# Patient Record
(demographics unavailable — no encounter records)

---

## 2024-10-30 NOTE — REVIEW OF SYSTEMS
[Fever] : no fever [Fatigue] : no fatigue [Recent Wt Gain (___ Lbs)] : ~T no recent weight gain [Chills] : no chills [Poor Appetite] : no poor appetite [Recent Wt Loss (___ Lbs)] : ~T no recent weight loss [Sore Throat] : no sore throat [Nasal Congestion] : no nasal congestion [Postnasal Drip] : postnasal drip [Sinus Problems] : no sinus problems [Cough] : cough [Chest Tightness] : no chest tightness [Sputum] : no sputum [Dyspnea] : no dyspnea [Pleuritic Pain] : no pleuritic pain [Wheezing] : no wheezing [SOB on Exertion] : no sob on exertion [Chest Discomfort] : no chest discomfort [Edema] : no edema [Palpitations] : palpitations [Seasonal Allergies] : no seasonal allergies [GERD] : no gerd [Abdominal Pain] : no abdominal pain [Nausea] : no nausea [Vomiting] : no vomiting [Dysuria] : no dysuria [Arthralgias] : no arthralgias [Myalgias] : no myalgias [Rash] : no rash [Easy Bruising] : no easy bruising [Clotting Disorder/ Frequent bleeding] : no clotting disorder/ frequent bleeding [Headache] : no headache [Focal Weakness] : no focal weakness [Dizziness] : no dizziness [Numbness] : no numbness [Anxiety] : anxiety [Diabetes] : no diabetes [Thyroid Problem] : thyroid problem [Obesity] : no obesity

## 2024-10-30 NOTE — HISTORY OF PRESENT ILLNESS
[TextBox_4] : Current HPI:  Reports breathing typically under control - main concern is persistent GI issues - bloating/dyspepsia, feels like it takes a long time to digest food.  Scheduled for gastric emptying study in 3 weeks.  Still on PPI.  Notes intermittent GERD symptoms, one time occurred after swimming and also made her feel SOB.  Minimal cough. Minimal sputum production.  Had episode of palpitations requiring her to call 911, evaluate in ED, EMS told her HR was 170 at that time.  Occurred 3 days after using 1/2vial of Levalbuterol and while was trying to taper off Metoprolol (was taking 12.5mg every other day, now back on daily).  Told her this was unlikely to be related.  Has only been performing ACT with Hypersal once daily.  Also using Aerobika.   (7/23/24): Ms. Ramsey returns for follow-up having just completed her course of antibiotics for MAC on 7/15/24. Reports doing well however noting some changes in vision which have been occurring more frequently since stopping abx - describes vision temporarily becoming like a "kaleidoscope" and then resolves as quickly as it starts with no accompanying symptoms. Has had no palpitations. No significant headaches. No shortness of breath. Swimming up to 40 laps at least 3x per week. Has been performing her ACTs daily using 1/2vial of Levalbuterol and the hypersal. Uses vest while performing ACTs , does not have an Aerobika. Having intermittent throat clearing with some sputum production. Undergoing workup for leukopenia and thrombocytopenia - rcvd 2nd round of Pneumo vacc and to repeat titers again. Will likely have BM biopsy for addt'l workup.   (4/24/24): Reports doing ok. Feels well most days but with episodes of fatigue the day after doing something strenuous. Her  reports though that she still bustles around from 6am-10pm everyday. No significant cough. No shortness of breath except mentioning episode in which she needed to present to ED after smelling cigarette smoke on the fabric of a car. She is still not taking Anoro ellipta. She only uses Hypersal three times weekly for her ACT, never started Levalbuterol. She is anxious to stop her R/E/A for M. avium. No recent issues with arrhythmias. Able to swim 30 laps in the pool. No fever, chills, weight loss.  (2/22/24): Ms. Ramsey returns for follow-up. Reports improvement in her dysphagia symptoms - following with GI, currently on PPI 40mg qAM and 20mg qPM. Tolerating R/E/A therapy M/W/F. Has been performing her ACT with just hypersal every AM, uses her vest at the same time. Never started Albuterol neb with hypersal as her Cardiologist told her not to. Similarly never started Anoro ellipta. Administered 1/2 ampule Albuterol neb today for Spirometry and tolerated without issue. Patient also noted improvement in breathing postBD. She went swimming a couple of weeks ago and noted no issues. She walks on her treadmill at home almost daily for exercise without limitation.   (1/11/24): Ms. Ramsey is a 73 yo F with PMHx GERD, WPW, HepC (acquired posttransfusion for massive hemorrhage from IUD, s/p treatment with Harvoni), Hashimoto's thyroid disease, hypothyroidism, recent thrush, diverticulitis, infectious colitis, leukopenia, thrombocytopenia who presents to establish care for her history of Pulmonary MAC disease. Patient reports history of intermittent shortness of breath affecting what had otherwise been an active lifestyle (swimmer) and underwent chest imaging showing multilobar nodular infiltrates and was subsequently sent for Bronchoscopy 12/20/22 with cultures showing MAC (chimaera-intracellulare) with also light growth of Aspergillus fumigatus. She was started on therapy with R/E/A daily 04/5/2023 and has been taking ever since that time.  She follows with several doctors including GI for GERD and dysphagia, A/I (Dr. Villa, [place]) for leukopenia, Heme/onc for thrombocytopenia, Cardiology (Brooks Lopez MD, [place], for WPW. She had recent EGD 12/14/23 which showed e/o candidiasis as well as a small hiatal hernia and was started on treatment. She also underwent MBS ([facility]) for complaints of progressive dysphagia particularly as it relates to her pills and per patient showed "slow transit". She has had to modify the way in which she takes her meds, crushing them and taking separately but consecutively in the morning.  She has been seen by a prior Pulmonologist who diagnosed her with Asthma and recommended initiation of an inhaler. Patient reports trialing the inhaler in the office but upon leaving developed an episode of palpitations/tachycardia and therefore opted not to start any inhalers. She has subsequently been started on Metoprolol ~ 8 months ago with improvement in her arrhythmias.  She has a [employer] vest (model 1000) that she uses infrequently and is not sure that she finds helpful, but is also not on any nebulizer treatments with either BDs or Hypersal due to concerns for possible side effects.

## 2024-10-30 NOTE — ASSESSMENT
[FreeTextEntry1] : Ms. Ramsey is a 73 yo F with PMHx GERD, WPW, HepC (acquired posttransfusion for massive hemorrhage from IUD, s/p treatment with Harvoni), Hashimoto's thyroid disease, hypothyroidism, recent thrush/esophageal candidiasis, diverticulitis, infectious colitis, leukopenia, thrombocytopenia who presents to establish care for her history of Pulmonary MAC disease. She has been on treatment with Rifabutin 300mg, Ethambutol 1200mg, Azithromycin 500mg DAILY since early 04/2023 after BAL performed 12/20/22 returned positive for MAC (chimaera/intracellulare which also showed light growth Aspergillus fumigatus). PFTs: FEV1/FVC 57, FEV1 1.69 (76%), FVC 2.94 (100%), %, %, DLCO 96% (refused Albuterol) which demonstrates moderate obstruction, hyperinflation with gas trapping on lung volumes, and normal diffusion capacity. Chest CT with TIB opacities and bilateral subcm solid pulmonary nodules, mild diffuse peribronchial thickening, scattered mucus plugging, areas of mild bronchiectasis, PA 3.6cm (largely unchanged to mildly improved compared to 3/29/23). Also recent fibroscan for history of HCV which showed no steatosis but mild fibrosis. Chest CT from 04/16/24 showing definite improvement in nodular infiltrates compared to 10/2022 (similar to 10/23 from some improved TIB opacities).  Recommend: #Pulmonary MAC - has e/o nodular bronchiectatic disease, completed Rifabutin 300mg, Ethambutol 1200mg, Azithromycin 500mg M/W/F July 15 2024 - cont ACT, worried about Albuterol neb (though tolerated today), Rx given for Xopenex + Hypersal 3.5% at least once daily, to twice daily while wearing hillrom vest (advised AGAIN on adding Xopenex even if just doing 1/2 vial) -  Aerobika for use inline when performing ACT treatments or afterwards - monthly AFB monitoring as able (has 3 cups, instructed on use); AFB sputum 8/29 and 7/23 NEGATIVE - repeat Chest HRCT largely stable compared to 10/2023 with some improvement in TIB opacities, however much improved compared to 10/2022 (reviewed 04/2024 visit imaging with patient/spouse) - following with Ophthalmology, no need for Audiology testing since off treatment (ENT office (Dr. Rae)) - counseled extensively on environmental factors to avoid which may have contributed to infection (avoid steam saunas, hot tubs - hot sauna ok) - Repeat Chest CT 04/2025  - discussed no need for earlier Chest CT/bronchoscopy unless has significant change in resp symptoms (follows with a Thoracic Surgeon who was advising?)  #COPD/Asthma - moderate obstruction/hyperinflation/gas trapping on PFTs - possible bronchiolitis vs RAD/Asthma - would advise (AGAIN) starting Anoro ellipta once daily (trying to avoid ICS in setting of MAC infection, pt can discuss with Cardiologist) - Spirometry WITH bronchodilator (some improvement with just half ampule of Albuterol but not meeting significance) + FENO (34) previously - Repeat PFTs and FENO at next f/u visit in 3 months  #GERD/Dysphagia - continue Pantoprazole per GI rec (Michel Quiñones MD [facility]), still with intermittent reflux post exercise? - f/u with GI, further management per GI, unclear if etiology is felt to be only infectious or other contributing causes - concern re: risk for chronic aspiration and respiratory compromise - had possible MBS earlier this year which was normal? as well as normal EGD/C-scope? - scheduled for Gastric emptying study in ~3wks ([facility])  #WPW - scheduled for an echo in 3 weeks per her Cardiologist  #Leukopenia/Immunodeficiency (hypogammaglobulinemia) - longstanding since prior to treatment with current MAC meds, undergoing eval by A/I - has been advised on need for Ig infusions, will start IVIG [id number] in Nov '24  #Thrombocytopenia - followed by H/O, possibility of BM biopsy in the future for further evaluation  RTC in 3 month for AFB monitoring and to evaluate resp status.  Huong Escobedo MD, MSCR.

## 2024-10-30 NOTE — PHYSICAL EXAM
[No Acute Distress] : no acute distress [Well Nourished] : well nourished [Well Developed] : well developed [Normal Oropharynx] : normal oropharynx [Normal Appearance] : normal appearance [Supple] : supple [No Neck Mass] : no neck mass [Normal Rate/Rhythm] : normal rate/rhythm [Normal S1, S2] : normal s1, s2 [No Murmurs] : no murmurs [No Resp Distress] : no resp distress [No Acc Muscle Use] : no acc muscle use [Normal Rhythm and Effort] : normal rhythm and effort [Rales] : rales [No Abnormalities] : no abnormalities [Benign] : benign [Normal Gait] : normal gait [No Clubbing] : no clubbing [No Cyanosis] : no cyanosis [No Edema] : no edema [Normal Color/ Pigmentation] : normal color/ pigmentation [No Focal Deficits] : no focal deficits [Oriented x3] : oriented x3 [Normal Affect] : normal affect [TextBox_68] : POLO ryan

## 2024-10-30 NOTE — ASSESSMENT
[FreeTextEntry1] : Ms. Ramsey is a 71 yo F with PMHx GERD, WPW, HepC (acquired posttransfusion for massive hemorrhage from IUD, s/p treatment with Harvoni), Hashimoto's thyroid disease, hypothyroidism, recent thrush/esophageal candidiasis, diverticulitis, infectious colitis, leukopenia, thrombocytopenia who presents to establish care for her history of Pulmonary MAC disease. She has been on treatment with Rifabutin 300mg, Ethambutol 1200mg, Azithromycin 500mg DAILY since early 04/2023 after BAL performed 12/20/22 returned positive for MAC (chimaera/intracellulare which also showed light growth Aspergillus fumigatus). PFTs: FEV1/FVC 57, FEV1 1.69 (76%), FVC 2.94 (100%), %, %, DLCO 96% (refused Albuterol) which demonstrates moderate obstruction, hyperinflation with gas trapping on lung volumes, and normal diffusion capacity. Chest CT with TIB opacities and bilateral subcm solid pulmonary nodules, mild diffuse peribronchial thickening, scattered mucus plugging, areas of mild bronchiectasis, PA 3.6cm (largely unchanged to mildly improved compared to 3/29/23). Also recent fibroscan for history of HCV which showed no steatosis but mild fibrosis. Chest CT from 04/16/24 showing definite improvement in nodular infiltrates compared to 10/2022 (similar to 10/23 from some improved TIB opacities).  Recommend: #Pulmonary MAC - has e/o nodular bronchiectatic disease, completed Rifabutin 300mg, Ethambutol 1200mg, Azithromycin 500mg M/W/F July 15 2024 - cont ACT, worried about Albuterol neb (though tolerated today), Rx given for Xopenex + Hypersal 3.5% at least once daily, to twice daily while wearing hillrom vest (advised AGAIN on adding Xopenex even if just doing 1/2 vial) -  Aerobika for use inline when performing ACT treatments or afterwards - monthly AFB monitoring as able (has 3 cups, instructed on use); AFB sputum 8/29 and 7/23 NEGATIVE - repeat Chest HRCT largely stable compared to 10/2023 with some improvement in TIB opacities, however much improved compared to 10/2022 (reviewed 04/2024 visit imaging with patient/spouse) - following with Ophthalmology, no need for Audiology testing since off treatment (ENT office (Dr. Rae)) - counseled extensively on environmental factors to avoid which may have contributed to infection (avoid steam saunas, hot tubs - hot sauna ok) - Repeat Chest CT 04/2025  - discussed no need for earlier Chest CT/bronchoscopy unless has significant change in resp symptoms (follows with a Thoracic Surgeon who was advising?)  #COPD/Asthma - moderate obstruction/hyperinflation/gas trapping on PFTs - possible bronchiolitis vs RAD/Asthma - would advise (AGAIN) starting Anoro ellipta once daily (trying to avoid ICS in setting of MAC infection, pt can discuss with Cardiologist) - Spirometry WITH bronchodilator (some improvement with just half ampule of Albuterol but not meeting significance) + FENO (34) previously - Repeat PFTs and FENO at next f/u visit in 3 months  #GERD/Dysphagia - continue Pantoprazole per GI rec (Michel Quiñones MD [facility]), still with intermittent reflux post exercise? - f/u with GI, further management per GI, unclear if etiology is felt to be only infectious or other contributing causes - concern re: risk for chronic aspiration and respiratory compromise - had possible MBS earlier this year which was normal? as well as normal EGD/C-scope? - scheduled for Gastric emptying study in ~3wks ([facility])  #WPW - scheduled for an echo in 3 weeks per her Cardiologist  #Leukopenia/Immunodeficiency (hypogammaglobulinemia) - longstanding since prior to treatment with current MAC meds, undergoing eval by A/I - has been advised on need for Ig infusions, will start IVIG [id number] in Nov '24  #Thrombocytopenia - followed by H/O, possibility of BM biopsy in the future for further evaluation  RTC in 3 month for AFB monitoring and to evaluate resp status.  Huong Escobedo MD, MSCR.

## 2024-10-30 NOTE — HISTORY OF PRESENT ILLNESS
[TextBox_4] : Current HPI:  Reports breathing typically under control - main concern is persistent GI issues - bloating/dyspepsia, feels like it takes a long time to digest food.  Scheduled for gastric emptying study in 3 weeks.  Still on PPI.  Notes intermittent GERD symptoms, one time occurred after swimming and also made her feel SOB.  Minimal cough. Minimal sputum production.  Had episode of palpitations requiring her to call 911, evaluate in ED, EMS told her HR was 170 at that time.  Occurred 3 days after using 1/2vial of Levalbuterol and while was trying to taper off Metoprolol (was taking 12.5mg every other day, now back on daily).  Told her this was unlikely to be related.  Has only been performing ACT with Hypersal once daily.  Also using Aerobika.   (7/23/24): Ms. Ramsey returns for follow-up having just completed her course of antibiotics for MAC on 7/15/24. Reports doing well however noting some changes in vision which have been occurring more frequently since stopping abx - describes vision temporarily becoming like a "kaleidoscope" and then resolves as quickly as it starts with no accompanying symptoms. Has had no palpitations. No significant headaches. No shortness of breath. Swimming up to 40 laps at least 3x per week. Has been performing her ACTs daily using 1/2vial of Levalbuterol and the hypersal. Uses vest while performing ACTs , does not have an Aerobika. Having intermittent throat clearing with some sputum production. Undergoing workup for leukopenia and thrombocytopenia - rcvd 2nd round of Pneumo vacc and to repeat titers again. Will likely have BM biopsy for addt'l workup.   (4/24/24): Reports doing ok. Feels well most days but with episodes of fatigue the day after doing something strenuous. Her  reports though that she still bustles around from 6am-10pm everyday. No significant cough. No shortness of breath except mentioning episode in which she needed to present to ED after smelling cigarette smoke on the fabric of a car. She is still not taking Anoro ellipta. She only uses Hypersal three times weekly for her ACT, never started Levalbuterol. She is anxious to stop her R/E/A for M. avium. No recent issues with arrhythmias. Able to swim 30 laps in the pool. No fever, chills, weight loss.  (2/22/24): Ms. Ramsey returns for follow-up. Reports improvement in her dysphagia symptoms - following with GI, currently on PPI 40mg qAM and 20mg qPM. Tolerating R/E/A therapy M/W/F. Has been performing her ACT with just hypersal every AM, uses her vest at the same time. Never started Albuterol neb with hypersal as her Cardiologist told her not to. Similarly never started Anoro ellipta. Administered 1/2 ampule Albuterol neb today for Spirometry and tolerated without issue. Patient also noted improvement in breathing postBD. She went swimming a couple of weeks ago and noted no issues. She walks on her treadmill at home almost daily for exercise without limitation.   (1/11/24): Ms. Ramsey is a 73 yo F with PMHx GERD, WPW, HepC (acquired posttransfusion for massive hemorrhage from IUD, s/p treatment with Harvoni), Hashimoto's thyroid disease, hypothyroidism, recent thrush, diverticulitis, infectious colitis, leukopenia, thrombocytopenia who presents to establish care for her history of Pulmonary MAC disease. Patient reports history of intermittent shortness of breath affecting what had otherwise been an active lifestyle (swimmer) and underwent chest imaging showing multilobar nodular infiltrates and was subsequently sent for Bronchoscopy 12/20/22 with cultures showing MAC (chimaera-intracellulare) with also light growth of Aspergillus fumigatus. She was started on therapy with R/E/A daily 04/5/2023 and has been taking ever since that time.  She follows with several doctors including GI for GERD and dysphagia, A/I (Dr. Villa, [place]) for leukopenia, Heme/onc for thrombocytopenia, Cardiology (Brooks Lopez MD, [place], for WPW. She had recent EGD 12/14/23 which showed e/o candidiasis as well as a small hiatal hernia and was started on treatment. She also underwent MBS ([facility]) for complaints of progressive dysphagia particularly as it relates to her pills and per patient showed "slow transit". She has had to modify the way in which she takes her meds, crushing them and taking separately but consecutively in the morning.  She has been seen by a prior Pulmonologist who diagnosed her with Asthma and recommended initiation of an inhaler. Patient reports trialing the inhaler in the office but upon leaving developed an episode of palpitations/tachycardia and therefore opted not to start any inhalers. She has subsequently been started on Metoprolol ~ 8 months ago with improvement in her arrhythmias.  She has a [employer] vest (model 1000) that she uses infrequently and is not sure that she finds helpful, but is also not on any nebulizer treatments with either BDs or Hypersal due to concerns for possible side effects.

## 2025-01-22 NOTE — ASSESSMENT
[FreeTextEntry1] : Ms. Ramsey is a 71 yo F with PMHx GERD, WPW, HepC (acquired posttransfusion for massive hemorrhage from IUD, s/p treatment with Harvoni), Hashimoto's thyroid disease, hypothyroidism, recent thrush/esophageal candidiasis, diverticulitis, infectious colitis, leukopenia, thrombocytopenia who presents to establish care for her history of Pulmonary MAC disease. She has been on treatment with Rifabutin 300mg, Ethambutol 1200mg, Azithromycin 500mg DAILY since early 04/2023 after BAL performed 12/20/22 returned positive for MAC (chimaera/intracellulare which also showed light growth Aspergillus fumigatus). PFTs: FEV1/FVC 57, FEV1 1.69 (76%), FVC 2.94 (100%), %, %, DLCO 96% (refused Albuterol) which demonstrates moderate obstruction, hyperinflation with gas trapping on lung volumes, and normal diffusion capacity. Chest CT with TIB opacities and bilateral subcm solid pulmonary nodules, mild diffuse peribronchial thickening, scattered mucus plugging, areas of mild bronchiectasis, PA 3.6cm (largely unchanged to mildly improved compared to 3/29/23). Also recent fibroscan for history of HCV which showed no steatosis but mild fibrosis. Chest CT from 04/16/24 showing definite improvement in nodular infiltrates compared to 10/2022 (similar to 10/23 from some improved TIB opacities).  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  Recommend: #Pulmonary MAC - has e/o nodular bronchiectatic disease, completed Rifabutin 300mg, Ethambutol 1200mg, Azithromycin 500mg M/W/F July 15 2024 - cont ACT, worried about Albuterol neb (though tolerated today), Rx given for Xopenex + Hypersal 3.5% at least once daily, to twice daily while wearing hillrom vest (advised AGAIN on adding Xopenex even if just doing 1/2 vial but refuses) - tolerating Hypersal alone - Aerobika for use inline when performing ACT treatments or afterwards - monthly AFB monitoring as able ; AFB sputum 12/5/24, 8/29/24 and 7/23/24 ALL NEGATIVE - counseled extensively AGAIN on environmental factors to avoid which may have contributed to infection (avoid steam saunas, hot tubs - hot sauna ok) - Repeat Chest CT 04/2025 - discussed no need for earlier Chest CT/bronchoscopy unless has significant change in resp symptoms (follows with a Thoracic Surgeon who was advising?)  #COPD/Asthma - moderate obstruction/hyperinflation/gas trapping on PFTs - possible bronchiolitis vs RAD/Asthma - advised in past re: starting Anoro ellipta once daily (trying to avoid ICS in setting of prior MAC infection) however see no indication as respiratory status is stable as of this time - Spirometry WITH bronchodilator (some improvement with just half ampule of Albuterol but not meeting significance) + FENO (34) previously - Repeat PFTs and FENO at next f/u visit in 3-4 months  #GERD/Dysphagia - less symptomatic; under tx for SIB) - f/u with GI, further management per GI - unknown results of gastric emptying study performed 11/2024  #WPW - no echo results received, no abnornalities noted per patient  #Leukopenia/Immunodeficiency (hypogammaglobulinemia) - longstanding since prior to treatment with current MAC meds, followed by A/I - started reduced dose IVIG infusions 11/2024  #Thrombocytopenia - followed by H/O, possibility of BM biopsy in the future for further evaluation  RTC in 3 month for AFB monitoring, review Chest CT and repeat PFTs.  Huong Escobedo MD, MSCR.

## 2025-01-22 NOTE — REVIEW OF SYSTEMS
[Fever] : no fever [Fatigue] : no fatigue [Recent Wt Gain (___ Lbs)] : ~T no recent weight gain [Chills] : no chills [Recent Wt Loss (___ Lbs)] : ~T no recent weight loss [Sore Throat] : no sore throat [Nasal Congestion] : no nasal congestion [Postnasal Drip] : no postnasal drip [Sinus Problems] : no sinus problems [Cough] : no cough [Chest Tightness] : no chest tightness [Sputum] : no sputum [Dyspnea] : no dyspnea [Pleuritic Pain] : no pleuritic pain [Wheezing] : no wheezing [SOB on Exertion] : no sob on exertion [Chest Discomfort] : no chest discomfort [Edema] : no edema [Orthopnea] : no orthopnea [Palpitations] : no palpitations [Abdominal Pain] : no abdominal pain [Nausea] : no nausea [Vomiting] : no vomiting [Constipation] : constipation [Dysphagia] : no dysphagia [Dysuria] : no dysuria [Rash] : no rash [Headache] : no headache [Focal Weakness] : no focal weakness [Dizziness] : no dizziness [Numbness] : no numbness [Confusion] : no confusion [Tremor] : no tremor [Diabetes] : no diabetes [Obesity] : no obesity [Negative] : Musculoskeletal [TextBox_69] : Dyspepsia/intermittent bloating

## 2025-01-22 NOTE — HISTORY OF PRESENT ILLNESS
[TextBox_4] : Current HPI:  Doing well from respiratory standpoint. No SOB, No cough.  No chest discomfort.  Reports having had a couple of short-lived episodes of "kaleidoscope vision changes" that would self resolve over 12-15min over the past few months (startin 09/2024) however on 12/15/24 called EMS after symptoms were persisting for several hours and was told she had possible stroke in the "back of my brain" upon ED evaluation at [facility].  She has been followed by Neuro (1/8/25 - Katie Cosme MD) and Ophtho since this episode who have performed follow-up testing including MRI/CT Angio however do not have results.  She has also been seen by her Cardiologist Dr. Brooks Lopez for EKG/Echo and holter monitoring.  In the interim she has also followed with GI and told she met criteria for SIBO and has been placed on Linzess and Rifaximin for 2 weeks, which she is about to complete.  She continues to only perform ACT with Hypersal 3% once daily as she was told by her Cardiologist not to use Albuterol at all.  She has not been swimming in a few weeks d/t pool heater not working but notices no change in breathing.  Weight stable.  No night sweats.   (10/29/24): Reports breathing typically under control - main concern is persistent GI issues - bloating/dyspepsia, feels like it takes a long time to digest food. Scheduled for gastric emptying study in 3 weeks. Still on PPI. Notes intermittent GERD symptoms, one time occurred after swimming and also made her feel SOB. Minimal cough. Minimal sputum production. Had episode of palpitations requiring her to call 911, evaluate in ED, EMS told her HR was 170 at that time. Occurred 3 days after using 1/2vial of Levalbuterol and while was trying to taper off Metoprolol (was taking 12.5mg every other day, now back on daily). Told her this was unlikely to be related. Has only been performing ACT with Hypersal once daily. Also using Aerobika.   (7/23/24): Ms. Ramsey returns for follow-up having just completed her course of antibiotics for MAC on 7/15/24. Reports doing well however noting some changes in vision which have been occurring more frequently since stopping abx - describes vision temporarily becoming like a "kaleidoscope" and then resolves as quickly as it starts with no accompanying symptoms. Has had no palpitations. No significant headaches. No shortness of breath. Swimming up to 40 laps at least 3x per week. Has been performing her ACTs daily using 1/2vial of Levalbuterol and the hypersal. Uses vest while performing ACTs , does not have an Aerobika. Having intermittent throat clearing with some sputum production. Undergoing workup for leukopenia and thrombocytopenia - rcvd 2nd round of Pneumo vacc and to repeat titers again. Will likely have BM biopsy for addt'l workup.   (4/24/24): Reports doing ok. Feels well most days but with episodes of fatigue the day after doing something strenuous. Her  reports though that she still bustles around from 6am-10pm everyday. No significant cough. No shortness of breath except mentioning episode in which she needed to present to ED after smelling cigarette smoke on the fabric of a car. She is still not taking Anoro ellipta. She only uses Hypersal three times weekly for her ACT, never started Levalbuterol. She is anxious to stop her R/E/A for M. avium. No recent issues with arrhythmias. Able to swim 30 laps in the pool. No fever, chills, weight loss.  (2/22/24): Ms. Ramsey returns for follow-up. Reports improvement in her dysphagia symptoms - following with GI, currently on PPI 40mg qAM and 20mg qPM. Tolerating R/E/A therapy M/W/F. Has been performing her ACT with just hypersal every AM, uses her vest at the same time. Never started Albuterol neb with hypersal as her Cardiologist told her not to. Similarly never started Anoro ellipta. Administered 1/2 ampule Albuterol neb today for Spirometry and tolerated without issue. Patient also noted improvement in breathing postBD. She went swimming a couple of weeks ago and noted no issues. She walks on her treadmill at home almost daily for exercise without limitation.   (1/11/24): Ms. Ramsey is a 73 yo F with PMHx GERD, WPW, HepC (acquired posttransfusion for massive hemorrhage from IUD, s/p treatment with Harvoni), Hashimoto's thyroid disease, hypothyroidism, recent thrush, diverticulitis, infectious colitis, leukopenia, thrombocytopenia who presents to establish care for her history of Pulmonary MAC disease. Patient reports history of intermittent shortness of breath affecting what had otherwise been an active lifestyle (swimmer) and underwent chest imaging showing multilobar nodular infiltrates and was subsequently sent for Bronchoscopy 12/20/22 with cultures showing MAC (chimaera-intracellulare) with also light growth of Aspergillus fumigatus. She was started on therapy with R/E/A daily 04/5/2023 and has been taking ever since that time.  She follows with several doctors including GI for GERD and dysphagia, A/I (Dr. Villa, [place]) for leukopenia, Heme/onc for thrombocytopenia, Cardiology (Brooks Lopez MD, [place], for WPW. She had recent EGD 12/14/23 which showed e/o candidiasis as well as a small hiatal hernia and was started on treatment. She also underwent MBS ([facility]) for complaints of progressive dysphagia particularly as it relates to her pills and per patient showed "slow transit". She has had to modify the way in which she takes her meds, crushing them and taking separately but consecutively in the morning.  She has been seen by a prior Pulmonologist who diagnosed her with Asthma and recommended initiation of an inhaler. Patient reports trialing the inhaler in the office but upon leaving developed an episode of palpitations/tachycardia and therefore opted not to start any inhalers. She has subsequently been started on Metoprolol ~ 8 months ago with improvement in her arrhythmias.  She has a [employer] vest (model 1000) that she uses infrequently and is not sure that she finds helpful, but is also not on any nebulizer treatments with either BDs or Hypersal due to concerns for possible side effects.

## 2025-01-22 NOTE — PHYSICAL EXAM
[No Acute Distress] : no acute distress [Well Nourished] : well nourished [Well Groomed] : well groomed [Well Developed] : well developed [Normal Oropharynx] : normal oropharynx [Normal Appearance] : normal appearance [No Neck Mass] : no neck mass [Normal Rate/Rhythm] : normal rate/rhythm [Normal S1, S2] : normal s1, s2 [No Murmurs] : no murmurs [No Resp Distress] : no resp distress [Normal Rhythm and Effort] : normal rhythm and effort [No Acc Muscle Use] : no acc muscle use [Clear to Auscultation Bilaterally] : clear to auscultation bilaterally [Benign] : benign [No Abnormalities] : no abnormalities [Normal Gait] : normal gait [No Clubbing] : no clubbing [No Cyanosis] : no cyanosis [No Edema] : no edema [Normal Color/ Pigmentation] : normal color/ pigmentation [No Focal Deficits] : no focal deficits [Oriented x3] : oriented x3 [Normal Insight/judgment] : normal insight/judgment [Normal Affect] : normal affect

## 2025-01-22 NOTE — HISTORY OF PRESENT ILLNESS
[TextBox_4] : Current HPI:  Doing well from respiratory standpoint. No SOB, No cough.  No chest discomfort.  Reports having had a couple of short-lived episodes of "kaleidoscope vision changes" that would self resolve over 12-15min over the past few months (startin 09/2024) however on 12/15/24 called EMS after symptoms were persisting for several hours and was told she had possible stroke in the "back of my brain" upon ED evaluation at [facility].  She has been followed by Neuro (1/8/25 - Katie Cosme MD) and Ophtho since this episode who have performed follow-up testing including MRI/CT Angio however do not have results.  She has also been seen by her Cardiologist Dr. Brooks Lopez for EKG/Echo and holter monitoring.  In the interim she has also followed with GI and told she met criteria for SIBO and has been placed on Linzess and Rifaximin for 2 weeks, which she is about to complete.  She continues to only perform ACT with Hypersal 3% once daily as she was told by her Cardiologist not to use Albuterol at all.  She has not been swimming in a few weeks d/t pool heater not working but notices no change in breathing.  Weight stable.  No night sweats.   (10/29/24): Reports breathing typically under control - main concern is persistent GI issues - bloating/dyspepsia, feels like it takes a long time to digest food. Scheduled for gastric emptying study in 3 weeks. Still on PPI. Notes intermittent GERD symptoms, one time occurred after swimming and also made her feel SOB. Minimal cough. Minimal sputum production. Had episode of palpitations requiring her to call 911, evaluate in ED, EMS told her HR was 170 at that time. Occurred 3 days after using 1/2vial of Levalbuterol and while was trying to taper off Metoprolol (was taking 12.5mg every other day, now back on daily). Told her this was unlikely to be related. Has only been performing ACT with Hypersal once daily. Also using Aerobika.   (7/23/24): Ms. Ramsey returns for follow-up having just completed her course of antibiotics for MAC on 7/15/24. Reports doing well however noting some changes in vision which have been occurring more frequently since stopping abx - describes vision temporarily becoming like a "kaleidoscope" and then resolves as quickly as it starts with no accompanying symptoms. Has had no palpitations. No significant headaches. No shortness of breath. Swimming up to 40 laps at least 3x per week. Has been performing her ACTs daily using 1/2vial of Levalbuterol and the hypersal. Uses vest while performing ACTs , does not have an Aerobika. Having intermittent throat clearing with some sputum production. Undergoing workup for leukopenia and thrombocytopenia - rcvd 2nd round of Pneumo vacc and to repeat titers again. Will likely have BM biopsy for addt'l workup.   (4/24/24): Reports doing ok. Feels well most days but with episodes of fatigue the day after doing something strenuous. Her  reports though that she still bustles around from 6am-10pm everyday. No significant cough. No shortness of breath except mentioning episode in which she needed to present to ED after smelling cigarette smoke on the fabric of a car. She is still not taking Anoro ellipta. She only uses Hypersal three times weekly for her ACT, never started Levalbuterol. She is anxious to stop her R/E/A for M. avium. No recent issues with arrhythmias. Able to swim 30 laps in the pool. No fever, chills, weight loss.  (2/22/24): Ms. Ramsey returns for follow-up. Reports improvement in her dysphagia symptoms - following with GI, currently on PPI 40mg qAM and 20mg qPM. Tolerating R/E/A therapy M/W/F. Has been performing her ACT with just hypersal every AM, uses her vest at the same time. Never started Albuterol neb with hypersal as her Cardiologist told her not to. Similarly never started Anoro ellipta. Administered 1/2 ampule Albuterol neb today for Spirometry and tolerated without issue. Patient also noted improvement in breathing postBD. She went swimming a couple of weeks ago and noted no issues. She walks on her treadmill at home almost daily for exercise without limitation.   (1/11/24): Ms. Ramsey is a 71 yo F with PMHx GERD, WPW, HepC (acquired posttransfusion for massive hemorrhage from IUD, s/p treatment with Harvoni), Hashimoto's thyroid disease, hypothyroidism, recent thrush, diverticulitis, infectious colitis, leukopenia, thrombocytopenia who presents to establish care for her history of Pulmonary MAC disease. Patient reports history of intermittent shortness of breath affecting what had otherwise been an active lifestyle (swimmer) and underwent chest imaging showing multilobar nodular infiltrates and was subsequently sent for Bronchoscopy 12/20/22 with cultures showing MAC (chimaera-intracellulare) with also light growth of Aspergillus fumigatus. She was started on therapy with R/E/A daily 04/5/2023 and has been taking ever since that time.  She follows with several doctors including GI for GERD and dysphagia, A/I (Dr. Villa, [place]) for leukopenia, Heme/onc for thrombocytopenia, Cardiology (Brooks Lopez MD, [place], for WPW. She had recent EGD 12/14/23 which showed e/o candidiasis as well as a small hiatal hernia and was started on treatment. She also underwent MBS ([facility]) for complaints of progressive dysphagia particularly as it relates to her pills and per patient showed "slow transit". She has had to modify the way in which she takes her meds, crushing them and taking separately but consecutively in the morning.  She has been seen by a prior Pulmonologist who diagnosed her with Asthma and recommended initiation of an inhaler. Patient reports trialing the inhaler in the office but upon leaving developed an episode of palpitations/tachycardia and therefore opted not to start any inhalers. She has subsequently been started on Metoprolol ~ 8 months ago with improvement in her arrhythmias.  She has a [employer] vest (model 1000) that she uses infrequently and is not sure that she finds helpful, but is also not on any nebulizer treatments with either BDs or Hypersal due to concerns for possible side effects.

## 2025-01-22 NOTE — ASSESSMENT
[FreeTextEntry1] : Ms. Ramsey is a 73 yo F with PMHx GERD, WPW, HepC (acquired posttransfusion for massive hemorrhage from IUD, s/p treatment with Harvoni), Hashimoto's thyroid disease, hypothyroidism, recent thrush/esophageal candidiasis, diverticulitis, infectious colitis, leukopenia, thrombocytopenia who presents to establish care for her history of Pulmonary MAC disease. She has been on treatment with Rifabutin 300mg, Ethambutol 1200mg, Azithromycin 500mg DAILY since early 04/2023 after BAL performed 12/20/22 returned positive for MAC (chimaera/intracellulare which also showed light growth Aspergillus fumigatus). PFTs: FEV1/FVC 57, FEV1 1.69 (76%), FVC 2.94 (100%), %, %, DLCO 96% (refused Albuterol) which demonstrates moderate obstruction, hyperinflation with gas trapping on lung volumes, and normal diffusion capacity. Chest CT with TIB opacities and bilateral subcm solid pulmonary nodules, mild diffuse peribronchial thickening, scattered mucus plugging, areas of mild bronchiectasis, PA 3.6cm (largely unchanged to mildly improved compared to 3/29/23). Also recent fibroscan for history of HCV which showed no steatosis but mild fibrosis. Chest CT from 04/16/24 showing definite improvement in nodular infiltrates compared to 10/2022 (similar to 10/23 from some improved TIB opacities).  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  Recommend: #Pulmonary MAC - has e/o nodular bronchiectatic disease, completed Rifabutin 300mg, Ethambutol 1200mg, Azithromycin 500mg M/W/F July 15 2024 - cont ACT, worried about Albuterol neb (though tolerated today), Rx given for Xopenex + Hypersal 3.5% at least once daily, to twice daily while wearing hillrom vest (advised AGAIN on adding Xopenex even if just doing 1/2 vial but refuses) - tolerating Hypersal alone - Aerobika for use inline when performing ACT treatments or afterwards - monthly AFB monitoring as able ; AFB sputum 12/5/24, 8/29/24 and 7/23/24 ALL NEGATIVE - counseled extensively AGAIN on environmental factors to avoid which may have contributed to infection (avoid steam saunas, hot tubs - hot sauna ok) - Repeat Chest CT 04/2025 - discussed no need for earlier Chest CT/bronchoscopy unless has significant change in resp symptoms (follows with a Thoracic Surgeon who was advising?)  #COPD/Asthma - moderate obstruction/hyperinflation/gas trapping on PFTs - possible bronchiolitis vs RAD/Asthma - advised in past re: starting Anoro ellipta once daily (trying to avoid ICS in setting of prior MAC infection) however see no indication as respiratory status is stable as of this time - Spirometry WITH bronchodilator (some improvement with just half ampule of Albuterol but not meeting significance) + FENO (34) previously - Repeat PFTs and FENO at next f/u visit in 3-4 months  #GERD/Dysphagia - less symptomatic; under tx for SIB) - f/u with GI, further management per GI - unknown results of gastric emptying study performed 11/2024  #WPW - no echo results received, no abnornalities noted per patient  #Leukopenia/Immunodeficiency (hypogammaglobulinemia) - longstanding since prior to treatment with current MAC meds, followed by A/I - started reduced dose IVIG infusions 11/2024  #Thrombocytopenia - followed by H/O, possibility of BM biopsy in the future for further evaluation  RTC in 3 month for AFB monitoring, review Chest CT and repeat PFTs.  Huong Escobedo MD, MSCR.

## 2025-01-22 NOTE — PHYSICAL EXAM
[No Acute Distress] : no acute distress [Well Nourished] : well nourished [Well Groomed] : well groomed [Well Developed] : well developed [Normal Oropharynx] : normal oropharynx [Normal Appearance] : normal appearance [No Neck Mass] : no neck mass [Normal Rate/Rhythm] : normal rate/rhythm [Normal S1, S2] : normal s1, s2 [No Murmurs] : no murmurs [No Resp Distress] : no resp distress [No Acc Muscle Use] : no acc muscle use [Normal Rhythm and Effort] : normal rhythm and effort [Clear to Auscultation Bilaterally] : clear to auscultation bilaterally [No Abnormalities] : no abnormalities [Benign] : benign [Normal Gait] : normal gait [No Cyanosis] : no cyanosis [No Clubbing] : no clubbing [No Edema] : no edema [Normal Color/ Pigmentation] : normal color/ pigmentation [No Focal Deficits] : no focal deficits [Oriented x3] : oriented x3 [Normal Insight/judgment] : normal insight/judgment [Normal Affect] : normal affect

## 2025-02-18 NOTE — HISTORY OF PRESENT ILLNESS
[TextBox_4] : Current HPI: Doing well after recent hospitalization for "palpitations" and Flu ([facility]).  No respiratory complaints. Performing ACT with hypersal alone.  Hasn't gone swimming in over a month.  No respiratory complaints. Unable to recall if Chest CT performed while at hospital.  (1/21/24): Doing well from respiratory standpoint. No SOB, No cough. No chest discomfort. Reports having had a couple of short-lived episodes of "kaleidoscope vision changes" that would self resolve over 12-15min over the past few months (startin 09/2024) however on 12/15/24 called EMS after symptoms were persisting for several hours and was told she had possible stroke in the "back of my brain" upon ED evaluation at [facility]. She has been followed by Neuro (1/8/25 - Katie Cosme MD) and Ophtho since this episode who have performed follow-up testing including MRI/CT Angio however do not have results. She has also been seen by her Cardiologist Dr. Brooks Lopez for EKG/Echo and holter monitoring. In the interim she has also followed with GI and told she met criteria for SIBO and has been placed on Linzess and Rifaximin for 2 weeks, which she is about to complete. She continues to only perform ACT with Hypersal 3% once daily as she was told by her Cardiologist not to use Albuterol at all. She has not been swimming in a few weeks d/t pool heater not working but notices no change in breathing. Weight stable. No night sweats.   (10/29/24): Reports breathing typically under control - main concern is persistent GI issues - bloating/dyspepsia, feels like it takes a long time to digest food. Scheduled for gastric emptying study in 3 weeks. Still on PPI. Notes intermittent GERD symptoms, one time occurred after swimming and also made her feel SOB. Minimal cough. Minimal sputum production. Had episode of palpitations requiring her to call 911, evaluate in ED, EMS told her HR was 170 at that time. Occurred 3 days after using 1/2vial of Levalbuterol and while was trying to taper off Metoprolol (was taking 12.5mg every other day, now back on daily). Told her this was unlikely to be related. Has only been performing ACT with Hypersal once daily. Also using Aerobika.   (7/23/24): Ms. Ramsey returns for follow-up having just completed her course of antibiotics for MAC on 7/15/24. Reports doing well however noting some changes in vision which have been occurring more frequently since stopping abx - describes vision temporarily becoming like a "kaleidoscope" and then resolves as quickly as it starts with no accompanying symptoms. Has had no palpitations. No significant headaches. No shortness of breath. Swimming up to 40 laps at least 3x per week. Has been performing her ACTs daily using 1/2vial of Levalbuterol and the hypersal. Uses vest while performing ACTs , does not have an Aerobika. Having intermittent throat clearing with some sputum production. Undergoing workup for leukopenia and thrombocytopenia - rcvd 2nd round of Pneumo vacc and to repeat titers again. Will likely have BM biopsy for addt'l workup.   (4/24/24): Reports doing ok. Feels well most days but with episodes of fatigue the day after doing something strenuous. Her  reports though that she still bustles around from 6am-10pm everyday. No significant cough. No shortness of breath except mentioning episode in which she needed to present to ED after smelling cigarette smoke on the fabric of a car. She is still not taking Anoro ellipta. She only uses Hypersal three times weekly for her ACT, never started Levalbuterol. She is anxious to stop her R/E/A for M. avium. No recent issues with arrhythmias. Able to swim 30 laps in the pool. No fever, chills, weight loss.  (2/22/24): Ms. Ramsey returns for follow-up. Reports improvement in her dysphagia symptoms - following with GI, currently on PPI 40mg qAM and 20mg qPM. Tolerating R/E/A therapy M/W/F. Has been performing her ACT with just hypersal every AM, uses her vest at the same time. Never started Albuterol neb with hypersal as her Cardiologist told her not to. Similarly never started Anoro ellipta. Administered 1/2 ampule Albuterol neb today for Spirometry and tolerated without issue. Patient also noted improvement in breathing postBD. She went swimming a couple of weeks ago and noted no issues. She walks on her treadmill at home almost daily for exercise without limitation.   (1/11/24): Ms. Ramsey is a 71 yo F with PMHx GERD, WPW, HepC (acquired posttransfusion for massive hemorrhage from IUD, s/p treatment with Harvoni), Hashimoto's thyroid disease, hypothyroidism, recent thrush, diverticulitis, infectious colitis, leukopenia, thrombocytopenia who presents to establish care for her history of Pulmonary MAC disease. Patient reports history of intermittent shortness of breath affecting what had otherwise been an active lifestyle (swimmer) and underwent chest imaging showing multilobar nodular infiltrates and was subsequently sent for Bronchoscopy 12/20/22 with cultures showing MAC (chimaera-intracellulare) with also light growth of Aspergillus fumigatus. She was started on therapy with R/E/A daily 04/5/2023 and has been taking ever since that time.  She follows with several doctors including GI for GERD and dysphagia, A/I (Dr. Villa, [place]) for leukopenia, Heme/onc for thrombocytopenia, Cardiology (Brooks Lopez MD, [place], for WPW. She had recent EGD 12/14/23 which showed e/o candidiasis as well as a small hiatal hernia and was started on treatment. She also underwent MBS ([facility]) for complaints of progressive dysphagia particularly as it relates to her pills and per patient showed "slow transit". She has had to modify the way in which she takes her meds, crushing them and taking separately but consecutively in the morning.  She has been seen by a prior Pulmonologist who diagnosed her with Asthma and recommended initiation of an inhaler. Patient reports trialing the inhaler in the office but upon leaving developed an episode of palpitations/tachycardia and therefore opted not to start any inhalers. She has subsequently been started on Metoprolol ~ 8 months ago with improvement in her arrhythmias.  She has a [employer] vest (model 1000) that she uses infrequently and is not sure that she finds helpful, but is also not on any nebulizer treatments with either BDs or Hypersal due to concerns for possible side effects.

## 2025-02-18 NOTE — HISTORY OF PRESENT ILLNESS
[TextBox_4] : Current HPI: Doing well after recent hospitalization for "palpitations" and Flu ([facility]).  No respiratory complaints. Performing ACT with hypersal alone.  Hasn't gone swimming in over a month.  No respiratory complaints. Unable to recall if Chest CT performed while at hospital.  (1/21/24): Doing well from respiratory standpoint. No SOB, No cough. No chest discomfort. Reports having had a couple of short-lived episodes of "kaleidoscope vision changes" that would self resolve over 12-15min over the past few months (startin 09/2024) however on 12/15/24 called EMS after symptoms were persisting for several hours and was told she had possible stroke in the "back of my brain" upon ED evaluation at [facility]. She has been followed by Neuro (1/8/25 - Katie Cosme MD) and Ophtho since this episode who have performed follow-up testing including MRI/CT Angio however do not have results. She has also been seen by her Cardiologist Dr. Brooks Lopez for EKG/Echo and holter monitoring. In the interim she has also followed with GI and told she met criteria for SIBO and has been placed on Linzess and Rifaximin for 2 weeks, which she is about to complete. She continues to only perform ACT with Hypersal 3% once daily as she was told by her Cardiologist not to use Albuterol at all. She has not been swimming in a few weeks d/t pool heater not working but notices no change in breathing. Weight stable. No night sweats.   (10/29/24): Reports breathing typically under control - main concern is persistent GI issues - bloating/dyspepsia, feels like it takes a long time to digest food. Scheduled for gastric emptying study in 3 weeks. Still on PPI. Notes intermittent GERD symptoms, one time occurred after swimming and also made her feel SOB. Minimal cough. Minimal sputum production. Had episode of palpitations requiring her to call 911, evaluate in ED, EMS told her HR was 170 at that time. Occurred 3 days after using 1/2vial of Levalbuterol and while was trying to taper off Metoprolol (was taking 12.5mg every other day, now back on daily). Told her this was unlikely to be related. Has only been performing ACT with Hypersal once daily. Also using Aerobika.   (7/23/24): Ms. Ramsey returns for follow-up having just completed her course of antibiotics for MAC on 7/15/24. Reports doing well however noting some changes in vision which have been occurring more frequently since stopping abx - describes vision temporarily becoming like a "kaleidoscope" and then resolves as quickly as it starts with no accompanying symptoms. Has had no palpitations. No significant headaches. No shortness of breath. Swimming up to 40 laps at least 3x per week. Has been performing her ACTs daily using 1/2vial of Levalbuterol and the hypersal. Uses vest while performing ACTs , does not have an Aerobika. Having intermittent throat clearing with some sputum production. Undergoing workup for leukopenia and thrombocytopenia - rcvd 2nd round of Pneumo vacc and to repeat titers again. Will likely have BM biopsy for addt'l workup.   (4/24/24): Reports doing ok. Feels well most days but with episodes of fatigue the day after doing something strenuous. Her  reports though that she still bustles around from 6am-10pm everyday. No significant cough. No shortness of breath except mentioning episode in which she needed to present to ED after smelling cigarette smoke on the fabric of a car. She is still not taking Anoro ellipta. She only uses Hypersal three times weekly for her ACT, never started Levalbuterol. She is anxious to stop her R/E/A for M. avium. No recent issues with arrhythmias. Able to swim 30 laps in the pool. No fever, chills, weight loss.  (2/22/24): Ms. Ramsey returns for follow-up. Reports improvement in her dysphagia symptoms - following with GI, currently on PPI 40mg qAM and 20mg qPM. Tolerating R/E/A therapy M/W/F. Has been performing her ACT with just hypersal every AM, uses her vest at the same time. Never started Albuterol neb with hypersal as her Cardiologist told her not to. Similarly never started Anoro ellipta. Administered 1/2 ampule Albuterol neb today for Spirometry and tolerated without issue. Patient also noted improvement in breathing postBD. She went swimming a couple of weeks ago and noted no issues. She walks on her treadmill at home almost daily for exercise without limitation.   (1/11/24): Ms. Ramsey is a 73 yo F with PMHx GERD, WPW, HepC (acquired posttransfusion for massive hemorrhage from IUD, s/p treatment with Harvoni), Hashimoto's thyroid disease, hypothyroidism, recent thrush, diverticulitis, infectious colitis, leukopenia, thrombocytopenia who presents to establish care for her history of Pulmonary MAC disease. Patient reports history of intermittent shortness of breath affecting what had otherwise been an active lifestyle (swimmer) and underwent chest imaging showing multilobar nodular infiltrates and was subsequently sent for Bronchoscopy 12/20/22 with cultures showing MAC (chimaera-intracellulare) with also light growth of Aspergillus fumigatus. She was started on therapy with R/E/A daily 04/5/2023 and has been taking ever since that time.  She follows with several doctors including GI for GERD and dysphagia, A/I (Dr. Villa, [place]) for leukopenia, Heme/onc for thrombocytopenia, Cardiology (Brooks Lopez MD, [place], for WPW. She had recent EGD 12/14/23 which showed e/o candidiasis as well as a small hiatal hernia and was started on treatment. She also underwent MBS ([facility]) for complaints of progressive dysphagia particularly as it relates to her pills and per patient showed "slow transit". She has had to modify the way in which she takes her meds, crushing them and taking separately but consecutively in the morning.  She has been seen by a prior Pulmonologist who diagnosed her with Asthma and recommended initiation of an inhaler. Patient reports trialing the inhaler in the office but upon leaving developed an episode of palpitations/tachycardia and therefore opted not to start any inhalers. She has subsequently been started on Metoprolol ~ 8 months ago with improvement in her arrhythmias.  She has a [employer] vest (model 1000) that she uses infrequently and is not sure that she finds helpful, but is also not on any nebulizer treatments with either BDs or Hypersal due to concerns for possible side effects.

## 2025-02-18 NOTE — ASSESSMENT
[FreeTextEntry1] : Ms. Ramsey is a 74 yo F with PMHx GERD, WPW, HepC (acquired posttransfusion for massive hemorrhage from IUD, s/p treatment with Harvoni), Hashimoto's thyroid disease, hypothyroidism, recent thrush/esophageal candidiasis, diverticulitis, infectious colitis, leukopenia, thrombocytopenia who presents to establish care for her history of Pulmonary MAC disease. She has been on treatment with Rifabutin 300mg, Ethambutol 1200mg, Azithromycin 500mg DAILY since early 04/2023 after BAL performed 12/20/22 returned positive for MAC (chimaera/intracellulare which also showed light growth Aspergillus fumigatus). PFTs: FEV1/FVC 57, FEV1 1.69 (76%), FVC 2.94 (100%), %, %, DLCO 96% (refused Albuterol) which demonstrates moderate obstruction, hyperinflation with gas trapping on lung volumes, and normal diffusion capacity. Chest CT with TIB opacities and bilateral subcm solid pulmonary nodules, mild diffuse peribronchial thickening, scattered mucus plugging, areas of mild bronchiectasis, PA 3.6cm (largely unchanged to mildly improved compared to 3/29/23). Also recent fibroscan for history of HCV which showed no steatosis but mild fibrosis. Chest CT from 04/16/24 showing definite improvement in nodular infiltrates compared to 10/2022 (similar to 10/23 from some improved TIB opacities).  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  Recommend: #Pulmonary MAC - has e/o nodular bronchiectatic disease, completed Rifabutin 300mg, Ethambutol 1200mg, Azithromycin 500mg M/W/F July 15 2024 - cont ACT,  Rx given for Xopenex + Hypersal 3.5% at least once daily, to twice daily while wearing hillrom vest (advised AGAIN on adding Xopenex even if just doing 1/2 vial but refuses) - tolerating Hypersal alone - Aerobika for use inline when performing ACT treatments or afterwards - monthly AFB monitoring as able ; AFB sputum 12/5/24, 8/29/24 and 7/23/24 ALL NEGATIVE - counseled extensively AGAIN on environmental factors to avoid which may have contributed to infection (avoid steam saunas, hot tubs - hot sauna ok) - Repeat Chest CT 04/2025 - discussed no need for earlier Chest CT/bronchoscopy unless has significant change in resp symptoms (follows with a Thoracic Surgeon who was advising?)  #COPD/Asthma - moderate obstruction/hyperinflation/gas trapping on PFTs - possible bronchiolitis vs RAD/Asthma - advised in past re: starting Anoro ellipta once daily (trying to avoid ICS in setting of prior MAC infection) however see no indication as respiratory status is stable as of this time - Spirometry WITH bronchodilator (some improvement with just half ampule of Albuterol but not meeting significance) + FENO (34) previously - Repeat PFTs and FENO at next f/u visit in 3-4 months  #GERD/Dysphagia - less symptomatic; under tx for SIB) - f/u with GI, further management per GI - unknown results of gastric emptying study performed 11/2024  #WPW - no echo results received, no abnornalities noted per patient  #Leukopenia/Immunodeficiency (hypogammaglobulinemia) - longstanding since prior to treatment with current MAC meds, followed by A/I - started reduced dose IVIG infusions 11/2024  #Thrombocytopenia - followed by H/O  RTC in 3 -4 month for AFB monitoring/symptom monitoring, if no Chest CT performed at [facility] then can perform 05/2025 in anticipation of visit.  Huong Escobedo MD, MSCR.

## 2025-02-18 NOTE — REVIEW OF SYSTEMS
[Postnasal Drip] : postnasal drip [Palpitations] : palpitations [Syncope] : syncope [Constipation] : constipation [Anxiety] : anxiety [Negative] : Hematologic [Fever] : no fever [Fatigue] : no fatigue [Recent Wt Gain (___ Lbs)] : ~T no recent weight gain [Chills] : no chills [Recent Wt Loss (___ Lbs)] : ~T no recent weight loss [Sore Throat] : no sore throat [Nasal Congestion] : no nasal congestion [Sinus Problems] : no sinus problems [Cough] : no cough [Chest Tightness] : no chest tightness [Sputum] : no sputum [Dyspnea] : no dyspnea [Pleuritic Pain] : no pleuritic pain [Wheezing] : no wheezing [SOB on Exertion] : no sob on exertion [Chest Discomfort] : no chest discomfort [Edema] : no edema [GERD] : no gerd [Abdominal Pain] : no abdominal pain [Nausea] : no nausea [Vomiting] : no vomiting [Dysphagia] : no dysphagia [Back Pain] : no back pain [Rash] : no rash [Headache] : no headache [Focal Weakness] : no focal weakness [Dizziness] : no dizziness [Diabetes] : no diabetes [Thyroid Problem] : no thyroid problem [Obesity] : no obesity

## 2025-02-18 NOTE — PHYSICAL EXAM
[No Acute Distress] : no acute distress [Well Nourished] : well nourished [Well Groomed] : well groomed [Well Developed] : well developed [Normal Oropharynx] : normal oropharynx [Normal Appearance] : normal appearance [Supple] : supple [No Neck Mass] : no neck mass [Normal Rate/Rhythm] : normal rate/rhythm [Normal S1, S2] : normal s1, s2 [No Murmurs] : no murmurs [No Resp Distress] : no resp distress [Clear to Auscultation Bilaterally] : clear to auscultation bilaterally [Rhonchi] : rhonchi [No Abnormalities] : no abnormalities [Benign] : benign [Normal Gait] : normal gait [No Clubbing] : no clubbing [No Cyanosis] : no cyanosis [No Edema] : no edema [Normal Color/ Pigmentation] : normal color/ pigmentation [No Focal Deficits] : no focal deficits [Oriented x3] : oriented x3 [Normal Insight/judgment] : normal insight/judgment [Normal Affect] : normal affect

## 2025-02-18 NOTE — ASSESSMENT
[FreeTextEntry1] : Ms. Ramsey is a 72 yo F with PMHx GERD, WPW, HepC (acquired posttransfusion for massive hemorrhage from IUD, s/p treatment with Harvoni), Hashimoto's thyroid disease, hypothyroidism, recent thrush/esophageal candidiasis, diverticulitis, infectious colitis, leukopenia, thrombocytopenia who presents to establish care for her history of Pulmonary MAC disease. She has been on treatment with Rifabutin 300mg, Ethambutol 1200mg, Azithromycin 500mg DAILY since early 04/2023 after BAL performed 12/20/22 returned positive for MAC (chimaera/intracellulare which also showed light growth Aspergillus fumigatus). PFTs: FEV1/FVC 57, FEV1 1.69 (76%), FVC 2.94 (100%), %, %, DLCO 96% (refused Albuterol) which demonstrates moderate obstruction, hyperinflation with gas trapping on lung volumes, and normal diffusion capacity. Chest CT with TIB opacities and bilateral subcm solid pulmonary nodules, mild diffuse peribronchial thickening, scattered mucus plugging, areas of mild bronchiectasis, PA 3.6cm (largely unchanged to mildly improved compared to 3/29/23). Also recent fibroscan for history of HCV which showed no steatosis but mild fibrosis. Chest CT from 04/16/24 showing definite improvement in nodular infiltrates compared to 10/2022 (similar to 10/23 from some improved TIB opacities).  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  Recommend: #Pulmonary MAC - has e/o nodular bronchiectatic disease, completed Rifabutin 300mg, Ethambutol 1200mg, Azithromycin 500mg M/W/F July 15 2024 - cont ACT,  Rx given for Xopenex + Hypersal 3.5% at least once daily, to twice daily while wearing hillrom vest (advised AGAIN on adding Xopenex even if just doing 1/2 vial but refuses) - tolerating Hypersal alone - Aerobika for use inline when performing ACT treatments or afterwards - monthly AFB monitoring as able ; AFB sputum 12/5/24, 8/29/24 and 7/23/24 ALL NEGATIVE - counseled extensively AGAIN on environmental factors to avoid which may have contributed to infection (avoid steam saunas, hot tubs - hot sauna ok) - Repeat Chest CT 04/2025 - discussed no need for earlier Chest CT/bronchoscopy unless has significant change in resp symptoms (follows with a Thoracic Surgeon who was advising?)  #COPD/Asthma - moderate obstruction/hyperinflation/gas trapping on PFTs - possible bronchiolitis vs RAD/Asthma - advised in past re: starting Anoro ellipta once daily (trying to avoid ICS in setting of prior MAC infection) however see no indication as respiratory status is stable as of this time - Spirometry WITH bronchodilator (some improvement with just half ampule of Albuterol but not meeting significance) + FENO (34) previously - Repeat PFTs and FENO at next f/u visit in 3-4 months  #GERD/Dysphagia - less symptomatic; under tx for SIB) - f/u with GI, further management per GI - unknown results of gastric emptying study performed 11/2024  #WPW - no echo results received, no abnornalities noted per patient  #Leukopenia/Immunodeficiency (hypogammaglobulinemia) - longstanding since prior to treatment with current MAC meds, followed by A/I - started reduced dose IVIG infusions 11/2024  #Thrombocytopenia - followed by H/O  RTC in 3 -4 month for AFB monitoring/symptom monitoring, if no Chest CT performed at [facility] then can perform 05/2025 in anticipation of visit.  Huong Escobedo MD, MSCR.

## 2025-02-20 NOTE — HISTORY OF PRESENT ILLNESS
[de-identified] : Malissa Ramsey came in for her follow-up appointment. She has been receiving Cuvitru subcutaneous infusions every two weeks, with 40 ML administered on both arms. Since November 6th, she has experienced pneumonia after receiving the vaccine in September of 2024. In January, she fainted and was taken to [facility], returning later for treatment of the flu. She was noted to have low blood pressure and was diagnosed with SIBO. Currently, she is taking sodium chloride tablets. 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 She has not had any acute episodes of increasing infection.  And the pulmonologist is satisfied how she is doing.

## 2025-02-20 NOTE — ASSESSMENT
[FreeTextEntry1] : Immunodeficiency in good control. I review of her blood test does indicate a total IgG of 1294.  3 months ago it was in the 900 range.. Continue CUVITRI every two week. F/in 4 moths.

## 2025-02-20 NOTE — PHYSICAL EXAM
